# Patient Record
(demographics unavailable — no encounter records)

---

## 2025-07-24 NOTE — PHYSICAL EXAM
[Normal] : Gait: normal [Cane] : ambulates with cane [Samuel's Sign] : negative Samuel's sign [Pronator Drift] : negative pronator drift [SLR] : negative straight leg raise [de-identified] : 5 out of 5 motor strength, sensation is intact and symmetrical full range of motion flexion extension and rotation, no palpatory tenderness full range of motion of hips knees shoulders and elbows (all four extremities), no atrophy, negative straight leg raise, no pathological reflexes, no swelling, normal ambulation, no apparent distress skin is intact, no palpable lymph nodes, no upper or lower extremity instability, alert and oriented x3 and normal mood. Normal finger-to nose test. Pain to the sacrum.  No ecchymosis over the sacral region. [de-identified] : XR AP Lat Lumbar 07/24/2025-degenerative disc disease, spondylolisthesis-reviewed with patient and family member.    XR Sacrum/Coccyx 7/24/25 - bilateral arthritic hips - reviewed with patient

## 2025-07-24 NOTE — DISCUSSION/SUMMARY
[de-identified] : Contusion to the sacrum. Lumbar degenerative disc disease with spondylolisthesis. All options discussed. Rx provided today for Meloxicam as needed. Prescription provided today for a walker.   If no better in 3-4 weeks, will obtain MRI of the sacrum to rule out occult fracture.  All options discussed including rest, medicine, home exercise, acupuncture, Chiropractic care, Physical Therapy, Pain management, and last resort surgery. All questions were answered, all alternatives discussed and the patient is in complete agreement with the treatment plan which the patient contributed to and discussed with me through the shared decision-making process. Follow-up appointment as instructed. Any issues and the patient will call or come in sooner.  Her family member agrees with the plan.

## 2025-07-24 NOTE — PHYSICAL EXAM
[Normal] : Gait: normal [Cane] : ambulates with cane [Samuel's Sign] : negative Samuel's sign [Pronator Drift] : negative pronator drift [SLR] : negative straight leg raise [de-identified] : 5 out of 5 motor strength, sensation is intact and symmetrical full range of motion flexion extension and rotation, no palpatory tenderness full range of motion of hips knees shoulders and elbows (all four extremities), no atrophy, negative straight leg raise, no pathological reflexes, no swelling, normal ambulation, no apparent distress skin is intact, no palpable lymph nodes, no upper or lower extremity instability, alert and oriented x3 and normal mood. Normal finger-to nose test. Pain to the sacrum.  No ecchymosis over the sacral region. [de-identified] : XR AP Lat Lumbar 07/24/2025-degenerative disc disease, spondylolisthesis-reviewed with patient and family member.    XR Sacrum/Coccyx 7/24/25 - bilateral arthritic hips - reviewed with patient

## 2025-07-24 NOTE — HISTORY OF PRESENT ILLNESS
[Stable] : stable [de-identified] : ALEXX KEITH is a 95 year-old female patient presents for an initial evaluation of lower back pain after a fall in her shower about 10 years ago.  She notes that the pain is at the coccyx and sacral area.  The pain radiates down the legs posteriorly to the calves, with tingling of the calves. Denies numbness. States that her legs feel weak. Pain is worse with laying on her back. She takes tylenol for the pain. She had tried PT for her back about 1 month ago but did not feel improvement. Denies SILVANA.  PMHx: HTN, osteoporosis  Ambulates with a cane.  No fever chills sweats nausea vomiting no bowel or bladder dysfunction, no recent weight loss or gain no night pain. This history is in addition to the intake form that I personally reviewed.

## 2025-07-24 NOTE — ADDENDUM
[FreeTextEntry1] : This note was written by Jed Stone on 07/24/2025 acting as scribe for Dr. Umberto Jung M.D.   I, Umberto Jung MD, have read and attest that all the information, medical decision making and discharge instructions within are true and accurate.

## 2025-07-24 NOTE — DISCUSSION/SUMMARY
[de-identified] : Contusion to the sacrum. Lumbar degenerative disc disease with spondylolisthesis. All options discussed. Rx provided today for Meloxicam as needed. Prescription provided today for a walker.   If no better in 3-4 weeks, will obtain MRI of the sacrum to rule out occult fracture.  All options discussed including rest, medicine, home exercise, acupuncture, Chiropractic care, Physical Therapy, Pain management, and last resort surgery. All questions were answered, all alternatives discussed and the patient is in complete agreement with the treatment plan which the patient contributed to and discussed with me through the shared decision-making process. Follow-up appointment as instructed. Any issues and the patient will call or come in sooner.  Her family member agrees with the plan.

## 2025-07-24 NOTE — HISTORY OF PRESENT ILLNESS
[Stable] : stable [de-identified] : ALEXX KEITH is a 95 year-old female patient presents for an initial evaluation of lower back pain after a fall in her shower about 10 years ago.  She notes that the pain is at the coccyx and sacral area.  The pain radiates down the legs posteriorly to the calves, with tingling of the calves. Denies numbness. States that her legs feel weak. Pain is worse with laying on her back. She takes tylenol for the pain. She had tried PT for her back about 1 month ago but did not feel improvement. Denies SILVANA.  PMHx: HTN, osteoporosis  Ambulates with a cane.  No fever chills sweats nausea vomiting no bowel or bladder dysfunction, no recent weight loss or gain no night pain. This history is in addition to the intake form that I personally reviewed.

## 2025-07-29 NOTE — ASSESSMENT
[Vaccines Reviewed] : Immunizations reviewed today. Please see immunization details in the vaccine log within the immunization flowsheet.  [FreeTextEntry1] :  this is an animated 95-year-old female whose history has been reviewed above  She has a history of hypertension the blood pressure is slightly above goal however at this point we will not intercede she does have significant lability  She remains on omeprazole we will obtain a serum magnesium she also is on risedronate for osteoporosis we will obtain a bone density  She has been negligent in terms of retinal disease.  She was encouraged to return to her retinal specialist  She does have a history of a gastric ulcer she is not complaining of pain continue PPI  She does have a history of a paraproteinemia we will get a protein electrophoresis  She is up-to-date with Pneumovax and flu I did encourage her to get an RSV shot she is over 90 and had smoking history

## 2025-07-29 NOTE — PHYSICAL EXAM
[No Acute Distress] : no acute distress [Well Nourished] : well nourished [Well Developed] : well developed [Well-Appearing] : well-appearing [Normal Sclera/Conjunctiva] : normal sclera/conjunctiva [PERRL] : pupils equal round and reactive to light [EOMI] : extraocular movements intact [Normal Outer Ear/Nose] : the outer ears and nose were normal in appearance [Normal Oropharynx] : the oropharynx was normal [No JVD] : no jugular venous distention [No Lymphadenopathy] : no lymphadenopathy [Supple] : supple [Thyroid Normal, No Nodules] : the thyroid was normal and there were no nodules present [No Respiratory Distress] : no respiratory distress  [No Accessory Muscle Use] : no accessory muscle use [Clear to Auscultation] : lungs were clear to auscultation bilaterally [Normal Rate] : normal rate  [Regular Rhythm] : with a regular rhythm [Normal S1, S2] : normal S1 and S2 [No Carotid Bruits] : no carotid bruits [No Abdominal Bruit] : a ~M bruit was not heard ~T in the abdomen [No Varicosities] : no varicosities [Pedal Pulses Present] : the pedal pulses are present [No Edema] : there was no peripheral edema [No Palpable Aorta] : no palpable aorta [No Extremity Clubbing/Cyanosis] : no extremity clubbing/cyanosis [Normal Appearance] : normal in appearance [No Nipple Discharge] : no nipple discharge [No Axillary Lymphadenopathy] : no axillary lymphadenopathy [Soft] : abdomen soft [Non Tender] : non-tender [Non-distended] : non-distended [No Masses] : no abdominal mass palpated [No HSM] : no HSM [Normal Bowel Sounds] : normal bowel sounds [Normal Posterior Cervical Nodes] : no posterior cervical lymphadenopathy [Normal Anterior Cervical Nodes] : no anterior cervical lymphadenopathy [No CVA Tenderness] : no CVA  tenderness [No Spinal Tenderness] : no spinal tenderness [No Joint Swelling] : no joint swelling [Grossly Normal Strength/Tone] : grossly normal strength/tone [No Rash] : no rash [Coordination Grossly Intact] : coordination grossly intact [No Focal Deficits] : no focal deficits [Normal Gait] : normal gait [Deep Tendon Reflexes (DTR)] : deep tendon reflexes were 2+ and symmetric [Normal Affect] : the affect was normal [Normal Insight/Judgement] : insight and judgment were intact [de-identified] : Short systolic ejection murmur

## 2025-07-29 NOTE — REVIEW OF SYSTEMS
[Vision Problems] : vision problems [Memory Loss] : memory loss [Unsteady Walking] : ataxia [Negative] : Heme/Lymph

## 2025-07-29 NOTE — HISTORY OF PRESENT ILLNESS
[FreeTextEntry1] : This is a 95-year-old female for annual health assessment.  Specifically we will address her history of hypertension macular degeneration balance difficulties change in mental status.  In addition she does have a paraproteinemia and mild valvular heart disease [de-identified] : Patient is accompanied by her son.  He states that she has had significant memory loss.  She is complaining of some low back pain.  She remains relatively independent she walks in her hallway she plays bridge.  But significant memory b gaps

## 2025-07-29 NOTE — HEALTH RISK ASSESSMENT
[Good] : ~his/her~ current health as good [Very Good] : ~his/her~  mood as very good [No] : In the past 12 months have you used drugs other than those required for medical reasons? No [No falls in past year] : Patient reported no falls in the past year [0] : 2) Feeling down, depressed, or hopeless: Not at all (0) [PHQ-2 Negative - No further assessment needed] : PHQ-2 Negative - No further assessment needed [Former] : Former [20 or more] : 20 or more [> 15 Years] : > 15 Years [NO] : No [Change in mental status noted] : Change in mental status noted [Language] : difficulty with language [Learning/Retaining New Information] : difficulty learning/retaining new information [Handling Complex Tasks] : difficulty handling complex tasks [Alone] : lives alone [Retired] : retired [College] : College [] :  [Feels Safe at Home] : Feels safe at home [Fully functional (bathing, dressing, toileting, transferring, walking, feeding)] : Fully functional (bathing, dressing, toileting, transferring, walking, feeding) [[id number]] : 0 [Behavior] : denies difficulty with behavior [Reasoning] : denies difficulty with reasoning [Spatial Ability and Orientation] : denies difficulty with spatial ability and orientation [Sexually Active] : not sexually active [de-identified] : Mostly independent.  Family does do the shopping and I presume finances